# Patient Record
Sex: MALE | Race: AMERICAN INDIAN OR ALASKA NATIVE | NOT HISPANIC OR LATINO | Employment: FULL TIME | URBAN - METROPOLITAN AREA
[De-identification: names, ages, dates, MRNs, and addresses within clinical notes are randomized per-mention and may not be internally consistent; named-entity substitution may affect disease eponyms.]

---

## 2017-05-04 ENCOUNTER — APPOINTMENT (OUTPATIENT)
Dept: RADIOLOGY | Facility: MEDICAL CENTER | Age: 57
DRG: 914 | End: 2017-05-04
Attending: EMERGENCY MEDICINE
Payer: COMMERCIAL

## 2017-05-04 ENCOUNTER — HOSPITAL ENCOUNTER (INPATIENT)
Facility: MEDICAL CENTER | Age: 57
LOS: 1 days | DRG: 914 | End: 2017-05-05
Attending: EMERGENCY MEDICINE | Admitting: SURGERY
Payer: COMMERCIAL

## 2017-05-04 LAB
ERYTHROCYTE [DISTWIDTH] IN BLOOD BY AUTOMATED COUNT: 40.7 FL (ref 35.9–50)
HCT VFR BLD AUTO: 46.6 % (ref 42–52)
HGB BLD-MCNC: 15.7 G/DL (ref 14–18)
MCH RBC QN AUTO: 28.9 PG (ref 27–33)
MCHC RBC AUTO-ENTMCNC: 33.7 G/DL (ref 33.7–35.3)
MCV RBC AUTO: 85.8 FL (ref 81.4–97.8)
PLATELET # BLD AUTO: 247 K/UL (ref 164–446)
PMV BLD AUTO: 10.8 FL (ref 9–12.9)
RBC # BLD AUTO: 5.43 M/UL (ref 4.7–6.1)
WBC # BLD AUTO: 11.2 K/UL (ref 4.8–10.8)

## 2017-05-04 PROCEDURE — 71010 DX-CHEST-LIMITED (1 VIEW): CPT

## 2017-05-04 PROCEDURE — 85027 COMPLETE CBC AUTOMATED: CPT

## 2017-05-04 PROCEDURE — 73070 X-RAY EXAM OF ELBOW: CPT | Mod: LT

## 2017-05-04 PROCEDURE — 96374 THER/PROPH/DIAG INJ IV PUSH: CPT

## 2017-05-04 PROCEDURE — 85730 THROMBOPLASTIN TIME PARTIAL: CPT

## 2017-05-04 PROCEDURE — 80053 COMPREHEN METABOLIC PANEL: CPT

## 2017-05-04 PROCEDURE — 85610 PROTHROMBIN TIME: CPT

## 2017-05-04 PROCEDURE — G0390 TRAUMA RESPONS W/HOSP CRITI: HCPCS

## 2017-05-04 PROCEDURE — 80307 DRUG TEST PRSMV CHEM ANLYZR: CPT

## 2017-05-04 PROCEDURE — 99291 CRITICAL CARE FIRST HOUR: CPT

## 2017-05-04 PROCEDURE — 700111 HCHG RX REV CODE 636 W/ 250 OVERRIDE (IP): Performed by: SURGERY

## 2017-05-04 RX ADMIN — CEFAZOLIN SODIUM 2 G: 1 INJECTION, SOLUTION INTRAVENOUS at 23:40

## 2017-05-04 NOTE — IP AVS SNAPSHOT
" Home Care Instructions                                                                                                                  Name:Ramin Gutierrez  Medical Record Number:[id number]  CSN: [id number]    YOB: 1960   Age: 56 y.o.  Sex: male  HT:1.727 m (5' 8\") WT: 116.3 kg (256 lb 6.3 oz)          Admit Date: 5/4/2017     Discharge Date:   Today's Date: 5/5/2017  Attending Doctor:  Emeterio Kingsley M.D.                  Allergies:  Review of patient's allergies indicates no known allergies.            Discharge Instructions       Discharge Instructions    Discharged to {DISCHARGE TO: Home by car with relative. Discharged via walking, hospital escort: Refused.  Special equipment needed: Not Applicable    Be sure to schedule a follow-up appointment with your primary care doctor or any specialists as instructed.     Discharge Plan:   Diet Plan: Discussed  Activity Level: Discussed  Smoking Cessation Offered: Patient Refused  Confirmed Follow up Appointment: Patient to Call and Schedule Appointment  Confirmed Symptoms Management: Discussed  Medication Reconciliation Updated: Yes  Influenza Vaccine Indication: Not indicated: Previously immunized this influenza season and > 8 years of age    I understand that a diet low in cholesterol, fat, and sodium is recommended for good health. Unless I have been given specific instructions below for another diet, I accept this instruction as my diet prescription.   Other diet: None    Special Instructions: None    · Is patient discharged on Warfarin / Coumadin?   No     · Is patient Post Blood Transfusion?  No    Depression / Suicide Risk    As you are discharged from this [facility] facility, it is important to learn how to keep safe from harming yourself.    Recognize the warning signs:  · Abrupt changes in personality, positive or negative- including increase in energy   · Giving away possessions  · Change in eating patterns- significant weight changes-  positive " or negative  · Change in sleeping patterns- unable to sleep or sleeping all the time   · Unwillingness or inability to communicate  · Depression  · Unusual sadness, discouragement and loneliness  · Talk of wanting to die  · Neglect of personal appearance   · Rebelliousness- reckless behavior  · Withdrawal from people/activities they love  · Confusion- inability to concentrate     If you or a loved one observes any of these behaviors or has concerns about self-harm, here's what you can do:  · Talk about it- your feelings and reasons for harming yourself  · Remove any means that you might use to hurt yourself (examples: pills, rope, extension cords, firearm)  · Get professional help from the community (Mental Health, Substance Abuse, psychological counseling)  · Do not be alone:Call your Safe Contact- someone whom you trust who will be there for you.  · Call your local CRISIS HOTLINE 111-5916 or 374-408-5788  · Call your local Children's Mobile Crisis Response Team [place] (231) 552-9795 or www.BioMotiv  · Call the toll free National Suicide Prevention Hotlines   · National Suicide Prevention Lifeline 610-174-WRWI (9529)  · National Hope Line Network 800-SUICIDE (352-8387)        Follow-up Information     1. Follow up with Pcp Not In Computer In 1 week.    Specialty:  Family Medicine        2. Follow up with Emeterio Kingsley M.D.. Schedule an appointment as soon as possible for a visit in 1 week.    Specialty:  Surgery    Contact information    [address] [facility] [address]  [place] [address] [phone]           Discharge Medication Instructions:    Below are the medications your physician expects you to take upon discharge:    Review all your home medications and newly ordered medications with your doctor and/or pharmacist. Follow medication instructions as directed by your doctor and/or pharmacist.    Please keep your medication list with you and share with your physician.               Medication  List      START taking these medications        Instructions    Morning Afternoon Evening Bedtime    hydrocodone-acetaminophen 5-325 MG Tabs per tablet   Last time this was given:  1 Tab on 5/5/2017 11:27 AM   Commonly known as:  NORCO        Take 1-2 Tabs by mouth every 6 hours as needed.   Dose:  1-2 Tab                          STOP taking these medications     aspirin EC 81 MG Tbec   Commonly known as:  ECOTRIN               multivitamin Tabs                    Where to Get Your Medications      Information about where to get these medications is not yet available     ! Ask your nurse or doctor about these medications    - hydrocodone-acetaminophen 5-325 MG Tabs per tablet            Instructions           Diet / Nutrition:    Follow any diet instructions given to you by your doctor or the dietician, including how much salt (sodium) you are allowed each day.    If you are overweight, talk to your doctor about a weight reduction plan.    Activity:    Remain physically active following your doctor's instructions about exercise and activity.    Rest often.     Any time you become even a little tired or short of breath, SIT DOWN and rest.    Worsening Symptoms:    Report any of the following signs and symptoms to the doctor's office immediately:    *Pain of jaw, arm, or neck  *Chest pain not relieved by medication                               *Dizziness or loss of consciousness  *Difficulty breathing even when at rest   *More tired than usual                                       *Bleeding drainage or swelling of surgical site  *Swelling of feet, ankles, legs or stomach                 *Fever (>100ºF)  *Pink or blood tinged sputum  *Weight gain (3lbs/day or 5lbs /week)           *Shock from internal defibrillator (if applicable)  *Palpitations or irregular heartbeats                *Cool and/or numb extremities    Stroke Awareness    Common Risk Factors for Stroke include:    Age  Atrial Fibrillation  Carotid  Artery Stenosis  Diabetes Mellitus  Excessive alcohol consumption  High blood pressure  Overweight   Physical inactivity  Smoking    Warning signs and symptoms of a stroke include:    *Sudden numbness or weakness of the face, arm or leg (especially on one side of the body).  *Sudden confusion, trouble speaking or understanding.  *Sudden trouble seeing in one or both eyes.  *Sudden trouble walking, dizziness, loss of balance or coordination.Sudden severe headache with no known cause.    It is very important to get treatment quickly when a stroke occurs. If you experience any of the above warning signs, call 911 immediately.                   Disclaimer         Quit Smoking / Tobacco Use:    I understand the use of any tobacco products increases my chance of suffering from future heart disease or stroke and could cause other illnesses which may shorten my life. Quitting the use of tobacco products is the single most important thing I can do to improve my health. For further information on smoking / tobacco cessation call a Toll Free Quit Line at 1-827.893.9885 (*National Cancer [place]) or 1-409.131.2836 (American Lung Association) or you can access the web based program at www.lungusa.org.    [place] Tobacco Users Help Line:  (850) 768-4327       Toll Free: 1-701.256.7738  Quit Tobacco Program [facility] (918)482-3525    Crisis Hotline:    [place] Crisis Hotline:  4-313-FTTOLRH or 1-329.566.7138    [place] Crisis Hotline:    1-131.388.8754 or 175-222-8869    Discharge Survey:   Thank you for choosing [facility]. We hope we did everything we could to make your hospital stay a pleasant one. You may be receiving a phone survey and we would appreciate your time and participation in answering the questions. Your input is very valuable to us in our efforts to improve our service to our patients and their families.        My signature on this form indicates that:    1. I have reviewed and  understand the above information.  2. My questions regarding this information have been answered to my satisfaction.  3. I have formulated a plan with my discharge nurse to obtain my prescribed medications for home.                  Disclaimer         __________________________________                     __________       ________                       Patient Signature                                                 Date                    Time

## 2017-05-04 NOTE — IP AVS SNAPSHOT
5/5/2017    Ramin Gutierrez  No address on file.    Dear Ramin:    [facility] wants to ensure your discharge home is safe and you or your loved ones have had all of your questions answered regarding your care after you leave the hospital.    Below is a list of resources and contact information should you have any questions regarding your hospital stay, follow-up instructions, or active medical symptoms.    Questions or Concerns Regarding… Contact   Medical Questions Related to Your Discharge  (7 days a week, 8am-5pm) Contact a Nurse Care Coordinator   [phone]   Medical Questions Not Related to Your Discharge  (24 hours a day / 7 days a week)  Contact the Nurse Health Line   [phone]    Medications or Discharge Instructions Refer to your discharge packet   or contact your [facility] Primary Care Provider   [phone]   Follow-up Appointment(s) Schedule your appointment via MedAware   or contact Scheduling [phone]   Billing Review your statement via MedAware  or contact Billing [phone]   Medical Records Review your records via MedAware   or contact Medical Records [phone]     You may receive a telephone call within two days of discharge. This call is to make certain you understand your discharge instructions and have the opportunity to have any questions answered. You can also easily access your medical information, test results and upcoming appointments via the MedAware free online health management tool. You can learn more and sign up at [employer]/MedAware. For assistance setting up your MedAware account, please call [phone].    Once again, we want to ensure your discharge home is safe and that you have a clear understanding of any next steps in your care. If you have any questions or concerns, please do not hesitate to contact us, we are here for you. Thank you for choosing [facility] for your healthcare needs.    Sincerely,    Your [facility] Healthcare Team

## 2017-05-04 NOTE — IP AVS SNAPSHOT
[employer] Access Code: [email or web address]  Expires: 6/4/2017  2:28 PM    Your email address is not on file at [facility].  Email Addresses are required for you to sign up for [employer], please contact [phone] to verify your personal information and to provide your email address prior to attempting to register for [employer].    Ramin Gutierrez  No address on file    [employer]  A secure, online tool to manage your health information     [facility]’s [employer]® is a secure, online tool that connects you to your personalized health information from the privacy of your home -- day or night - making it very easy for you to manage your healthcare. Once the activation process is completed, you can even access your medical information using the [employer] latoya, which is available for free in the Apple Latoya store or Google Play store.     To learn more about [employer], visit [email or web address]    There are two levels of access available (as shown below):   My Chart Features  [facility] Primary Care Doctor [facility]  Specialists [facility]  Urgent  Care Non-[facility] Primary Care Doctor   Email your healthcare team securely and privately 24/7 X X X    Manage appointments: schedule your next appointment; view details of past/upcoming appointments X      Request prescription refills. X      View recent personal medical records, including lab and immunizations X X X X   View health record, including health history, allergies, medications X X X X   Read reports about your outpatient visits, procedures, consult and ER notes X X X X   See your discharge summary, which is a recap of your hospital and/or ER visit that includes your diagnosis, lab results, and care plan X X  X     How to register for [employer]:  Once your e-mail address has been verified, follow the following steps to sign up for [employer].     1. Go to  [email or web address]  2. Click on the Sign Up Now box, which takes you to the New Member Sign Up page. You will need to provide the  following information:  a. Enter your LiveN[employer] Access Code exactly as it appears at the top of this page. (You will not need to use this code after you’ve completed the sign-up process. If you do not sign up before the expiration date, you must request a new code.)   b. Enter your date of birth.   c. Enter your home email address.   d. Click Submit, and follow the next screen’s instructions.  3. Create a LiveNinja ID. This will be your LiveNinja login ID and cannot be changed, so think of one that is secure and easy to remember.  4. Create a LiveNinja password. You can change your password at any time.  5. Enter your Password Reset Question and Answer. This can be used at a later time if you forget your password.   6. Enter your e-mail address. This allows you to receive e-mail notifications when new information is available in LiveNinja.  7. Click Sign Up. You can now view your health information.    For assistance activating your [employer] account, call [phone]

## 2017-05-04 NOTE — IP AVS SNAPSHOT
" <p align=\"LEFT\"><IMG SRC=\"//[email or web address]$/[email or web address]\" alt=\"Image\" WIDTH=\"50%\" HEIGHT=\"200\" BORDER=\"\"></p>                   Name:Ramin Gutierrez  Medical Record Number:[id number]  CSN: [id number]    YOB: 1960   Age: 56 y.o.  Sex: male  HT:1.727 m (5' 8\") WT: 116.3 kg (256 lb 6.3 oz)          Admit Date: 5/4/2017     Discharge Date:   Today's Date: 5/5/2017  Attending Doctor:  Emeterio Kingsley M.D.                  Allergies:  Review of patient's allergies indicates no known allergies.          Follow-up Information     1. Follow up with Pcp Not In Computer In 1 week.    Specialty:  Family Medicine        2. Follow up with Emeterio Kingsley M.D.. Schedule an appointment as soon as possible for a visit in 1 week.    Specialty:  Surgery    Contact information    [address] [facility] [address]  [address]  [place] [id number] [phone]           Medication List      Take these Medications        Instructions    hydrocodone-acetaminophen 5-325 MG Tabs per tablet   Commonly known as:  NORCO    Take 1-2 Tabs by mouth every 6 hours as needed.   Dose:  1-2 Tab         "

## 2017-05-05 VITALS
RESPIRATION RATE: 18 BRPM | OXYGEN SATURATION: 95 % | WEIGHT: 256.39 LBS | BODY MASS INDEX: 38.86 KG/M2 | HEIGHT: 68 IN | DIASTOLIC BLOOD PRESSURE: 91 MMHG | HEART RATE: 95 BPM | TEMPERATURE: 98.2 F | SYSTOLIC BLOOD PRESSURE: 122 MMHG

## 2017-05-05 PROBLEM — T14.90XA TRAUMA: Status: ACTIVE | Noted: 2017-05-05

## 2017-05-05 PROBLEM — I10 HTN (HYPERTENSION): Status: ACTIVE | Noted: 2017-05-05

## 2017-05-05 PROBLEM — E10.9 TYPE 1 DIABETES MELLITUS (HCC): Status: ACTIVE | Noted: 2017-05-05

## 2017-05-05 PROBLEM — S41.139A GUNSHOT WOUND OF ARM: Status: ACTIVE | Noted: 2017-05-05

## 2017-05-05 PROBLEM — F10.10 ETOH ABUSE: Status: ACTIVE | Noted: 2017-05-05

## 2017-05-05 PROBLEM — E66.9 OBESITY: Status: ACTIVE | Noted: 2017-05-05
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
## 2017-05-05 LAB
ABO GROUP BLD: NORMAL
ABO GROUP BLD: NORMAL
ALBUMIN SERPL BCP-MCNC: 4 G/DL (ref 3.2–4.9)
ALBUMIN/GLOB SERPL: 1.1 G/DL
ALP SERPL-CCNC: 83 U/L (ref 30–99)
ALT SERPL-CCNC: 31 U/L (ref 2–50)
ANION GAP SERPL CALC-SCNC: 23 MMOL/L (ref 0–11.9)
APTT PPP: 24.4 SEC (ref 24.7–36)
AST SERPL-CCNC: 23 U/L (ref 12–45)
BILIRUB SERPL-MCNC: 0.4 MG/DL (ref 0.1–1.5)
BLD GP AB SCN SERPL QL: NORMAL
BUN SERPL-MCNC: 12 MG/DL (ref 8–22)
CALCIUM SERPL-MCNC: 9.3 MG/DL (ref 8.5–10.5)
CHLORIDE SERPL-SCNC: 96 MMOL/L (ref 96–112)
CO2 SERPL-SCNC: 11 MMOL/L (ref 20–33)
CREAT SERPL-MCNC: 0.86 MG/DL (ref 0.5–1.4)
ETHANOL BLD-MCNC: 0.02 G/DL
EXPOSED MRN EXMRN: NORMAL
GFR SERPL CREATININE-BSD FRML MDRD: >60 ML/MIN/1.73 M 2
GLOBULIN SER CALC-MCNC: 3.5 G/DL (ref 1.9–3.5)
GLUCOSE BLD-MCNC: 268 MG/DL (ref 65–99)
GLUCOSE SERPL-MCNC: 337 MG/DL (ref 65–99)
HBV SURFACE AG SER QL: NEGATIVE
HCV AB SER QL: NEGATIVE
HIV 1+2 AB+HIV1 P24 AG SERPL QL IA: NON REACTIVE
INR PPP: 0.87 (ref 0.87–1.13)
POTASSIUM SERPL-SCNC: 3.9 MMOL/L (ref 3.6–5.5)
PROT SERPL-MCNC: 7.5 G/DL (ref 6–8.2)
PROTHROMBIN TIME: 12.1 SEC (ref 12–14.6)
RH BLD: NORMAL
SODIUM SERPL-SCNC: 130 MMOL/L (ref 135–145)

## 2017-05-05 PROCEDURE — 70498 CT ANGIOGRAPHY NECK: CPT

## 2017-05-05 PROCEDURE — A9270 NON-COVERED ITEM OR SERVICE: HCPCS | Performed by: NURSE PRACTITIONER

## 2017-05-05 PROCEDURE — 86900 BLOOD TYPING SEROLOGIC ABO: CPT

## 2017-05-05 PROCEDURE — 700102 HCHG RX REV CODE 250 W/ 637 OVERRIDE(OP): Performed by: NURSE PRACTITIONER

## 2017-05-05 PROCEDURE — 700117 HCHG RX CONTRAST REV CODE 255: Performed by: EMERGENCY MEDICINE

## 2017-05-05 PROCEDURE — 73206 CT ANGIO UPR EXTRM W/O&W/DYE: CPT | Mod: LT

## 2017-05-05 PROCEDURE — 93931 UPPER EXTREMITY STUDY: CPT | Mod: 26 | Performed by: SURGERY

## 2017-05-05 PROCEDURE — 86850 RBC ANTIBODY SCREEN: CPT

## 2017-05-05 PROCEDURE — 86901 BLOOD TYPING SEROLOGIC RH(D): CPT

## 2017-05-05 PROCEDURE — 770022 HCHG ROOM/CARE - ICU (200)

## 2017-05-05 PROCEDURE — 700105 HCHG RX REV CODE 258: Performed by: SURGERY

## 2017-05-05 PROCEDURE — 93931 UPPER EXTREMITY STUDY: CPT

## 2017-05-05 PROCEDURE — 82962 GLUCOSE BLOOD TEST: CPT

## 2017-05-05 PROCEDURE — 700111 HCHG RX REV CODE 636 W/ 250 OVERRIDE (IP): Performed by: SURGERY

## 2017-05-05 RX ORDER — ONDANSETRON 2 MG/ML
4 INJECTION INTRAMUSCULAR; INTRAVENOUS EVERY 4 HOURS PRN
Status: DISCONTINUED | OUTPATIENT
Start: 2017-05-05 | End: 2017-05-05 | Stop reason: HOSPADM

## 2017-05-05 RX ORDER — HYDROCODONE BITARTRATE AND ACETAMINOPHEN 5; 325 MG/1; MG/1
1-2 TABLET ORAL EVERY 6 HOURS PRN
Status: DISCONTINUED | OUTPATIENT
Start: 2017-05-05 | End: 2017-05-05 | Stop reason: HOSPADM

## 2017-05-05 RX ORDER — DOCUSATE SODIUM 100 MG/1
100 CAPSULE, LIQUID FILLED ORAL 2 TIMES DAILY
Status: DISCONTINUED | OUTPATIENT
Start: 2017-05-05 | End: 2017-05-05 | Stop reason: HOSPADM

## 2017-05-05 RX ORDER — AMOXICILLIN 250 MG
1 CAPSULE ORAL NIGHTLY
Status: DISCONTINUED | OUTPATIENT
Start: 2017-05-05 | End: 2017-05-05 | Stop reason: HOSPADM

## 2017-05-05 RX ORDER — HYDROCODONE BITARTRATE AND ACETAMINOPHEN 5; 325 MG/1; MG/1
1-2 TABLET ORAL EVERY 6 HOURS PRN
Qty: 40 TAB | Refills: 0 | Status: SHIPPED | OUTPATIENT
Start: 2017-05-05 | End: 2019-09-24

## 2017-05-05 RX ORDER — AMOXICILLIN 250 MG
1 CAPSULE ORAL
Status: DISCONTINUED | OUTPATIENT
Start: 2017-05-05 | End: 2017-05-05 | Stop reason: HOSPADM

## 2017-05-05 RX ORDER — BISACODYL 10 MG
10 SUPPOSITORY, RECTAL RECTAL
Status: DISCONTINUED | OUTPATIENT
Start: 2017-05-05 | End: 2017-05-05 | Stop reason: HOSPADM

## 2017-05-05 RX ORDER — POLYETHYLENE GLYCOL 3350 17 G/17G
1 POWDER, FOR SOLUTION ORAL 2 TIMES DAILY
Status: DISCONTINUED | OUTPATIENT
Start: 2017-05-05 | End: 2017-05-05 | Stop reason: HOSPADM

## 2017-05-05 RX ORDER — ENEMA 19; 7 G/133ML; G/133ML
1 ENEMA RECTAL
Status: DISCONTINUED | OUTPATIENT
Start: 2017-05-05 | End: 2017-05-05 | Stop reason: HOSPADM

## 2017-05-05 RX ORDER — SODIUM CHLORIDE, SODIUM LACTATE, POTASSIUM CHLORIDE, CALCIUM CHLORIDE 600; 310; 30; 20 MG/100ML; MG/100ML; MG/100ML; MG/100ML
INJECTION, SOLUTION INTRAVENOUS CONTINUOUS
Status: DISCONTINUED | OUTPATIENT
Start: 2017-05-05 | End: 2017-05-05

## 2017-05-05 RX ORDER — CHLORHEXIDINE GLUCONATE ORAL RINSE 1.2 MG/ML
15 SOLUTION DENTAL EVERY 12 HOURS
Status: DISCONTINUED | OUTPATIENT
Start: 2017-05-05 | End: 2017-05-05

## 2017-05-05 RX ORDER — ACETAMINOPHEN 325 MG/1
650 TABLET ORAL EVERY 4 HOURS PRN
Status: DISCONTINUED | OUTPATIENT
Start: 2017-05-05 | End: 2017-05-05 | Stop reason: HOSPADM

## 2017-05-05 RX ADMIN — SODIUM CHLORIDE, POTASSIUM CHLORIDE, SODIUM LACTATE AND CALCIUM CHLORIDE: 600; 310; 30; 20 INJECTION, SOLUTION INTRAVENOUS at 01:51

## 2017-05-05 RX ADMIN — HYDROCODONE BITARTRATE AND ACETAMINOPHEN 1 TABLET: 5; 325 TABLET ORAL at 11:27

## 2017-05-05 RX ADMIN — IOHEXOL 100 ML: 350 INJECTION, SOLUTION INTRAVENOUS at 00:17

## 2017-05-05 RX ADMIN — MORPHINE SULFATE 50 MG: 50 INJECTION, SOLUTION, CONCENTRATE INTRAVENOUS at 02:28

## 2017-05-05 ASSESSMENT — COPD QUESTIONNAIRES
DO YOU EVER COUGH UP ANY MUCUS OR PHLEGM?: NO/ONLY WITH OCCASIONAL COLDS OR INFECTIONS
COPD SCREENING SCORE: 3
DURING THE PAST 4 WEEKS HOW MUCH DID YOU FEEL SHORT OF BREATH: NONE/LITTLE OF THE TIME
HAVE YOU SMOKED AT LEAST 100 CIGARETTES IN YOUR ENTIRE LIFE: YES

## 2017-05-05 ASSESSMENT — LIFESTYLE VARIABLES
EVER_SMOKED: YES
SUBSTANCE_ABUSE: 1
TOTAL SCORE: 0
TOTAL SCORE: 0
AVERAGE NUMBER OF DAYS PER WEEK YOU HAVE A DRINK CONTAINING ALCOHOL: 1
EVER FELT BAD OR GUILTY ABOUT YOUR DRINKING: NO
CONSUMPTION TOTAL: POSITIVE
HOW MANY TIMES IN THE PAST YEAR HAVE YOU HAD 5 OR MORE DRINKS IN A DAY: 10
TOTAL SCORE: 0
EVER HAD A DRINK FIRST THING IN THE MORNING TO STEADY YOUR NERVES TO GET RID OF A HANGOVER: NO
ON A TYPICAL DAY WHEN YOU DRINK ALCOHOL HOW MANY DRINKS DO YOU HAVE: 8
HAVE YOU EVER FELT YOU SHOULD CUT DOWN ON YOUR DRINKING: NO
ALCOHOL_USE: YES
HAVE PEOPLE ANNOYED YOU BY CRITICIZING YOUR DRINKING: NO

## 2017-05-05 ASSESSMENT — PAIN SCALES - GENERAL
PAINLEVEL_OUTOF10: 2
PAINLEVEL_OUTOF10: 5
PAINLEVEL_OUTOF10: 2

## 2017-05-05 ASSESSMENT — ENCOUNTER SYMPTOMS
DIZZINESS: 0
SPEECH CHANGE: 0
FOCAL WEAKNESS: 0
SHORTNESS OF BREATH: 0
COUGH: 0
MYALGIAS: 1
POLYDIPSIA: 0
SHORTNESS OF BREATH: 1
BLURRED VISION: 0
HEADACHES: 0
FEVER: 0
CHILLS: 0
VOMITING: 0
NAUSEA: 0
DOUBLE VISION: 0
MYALGIAS: 0
BACK PAIN: 0
NECK PAIN: 1
SENSORY CHANGE: 1
ABDOMINAL PAIN: 0

## 2017-05-05 NOTE — PROGRESS NOTES
Med rec complete per patient  Allergies reviewed    Per patient he is borderline diabetic but does not take what medications he's prescribed, stated he feels fine. I asked if he has taken any of them in last 2 weeks and he stated NO. He said it was a BP med, insulin and metformin that's all he could remeber
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
## 2017-05-05 NOTE — ED NOTES
Report given to CLYDE Gray.  Family at bedside.  Pt has all glasses and chart.  Clothing taken by [employer].

## 2017-05-05 NOTE — CARE PLAN
Problem: Safety  Goal: Free from accidental injury  Pt uses call light prior to mobilizing to EOB.  Bed alarm activated.       Problem: Pain  Goal: Alleviation of Pain or a reduction in pain to the patient’s comfort goal  Morphine pca in use.  Continuous dull discomfort in L arm.  Arm elevated for comfort.    Problem: Hemodynamic Status  Goal: Vital Signs and Fluid Balance Management  Intervention: Assess peripheral pulses and capillary refill  Assessment of pulse in L arm.  Slight increase in swelling, arm elevated.  Pulses and CMS intact.
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
## 2017-05-05 NOTE — DISCHARGE PLANNING
Medical Social Work:  Family arrived.     Daughter  Amanda Gutierrez  [phone]  [address] [address]    Family taken to Pt.
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
## 2017-05-05 NOTE — DISCHARGE INSTRUCTIONS
Discharge Instructions    Discharged to {DISCHARGE TO: Home by car with relative. Discharged via walking, hospital escort: Refused.  Special equipment needed: Not Applicable    Be sure to schedule a follow-up appointment with your primary care doctor or any specialists as instructed.     Discharge Plan:   Diet Plan: Discussed  Activity Level: Discussed  Smoking Cessation Offered: Patient Refused  Confirmed Follow up Appointment: Patient to Call and Schedule Appointment  Confirmed Symptoms Management: Discussed  Medication Reconciliation Updated: Yes  Influenza Vaccine Indication: Not indicated: Previously immunized this influenza season and > 8 years of age    I understand that a diet low in cholesterol, fat, and sodium is recommended for good health. Unless I have been given specific instructions below for another diet, I accept this instruction as my diet prescription.   Other diet: None    Special Instructions: None    · Is patient discharged on Warfarin / Coumadin?   No     · Is patient Post Blood Transfusion?  No    Depression / Suicide Risk    As you are discharged from this [facility] facility, it is important to learn how to keep safe from harming yourself.    Recognize the warning signs:  · Abrupt changes in personality, positive or negative- including increase in energy   · Giving away possessions  · Change in eating patterns- significant weight changes-  positive or negative  · Change in sleeping patterns- unable to sleep or sleeping all the time   · Unwillingness or inability to communicate  · Depression  · Unusual sadness, discouragement and loneliness  · Talk of wanting to die  · Neglect of personal appearance   · Rebelliousness- reckless behavior  · Withdrawal from people/activities they love  · Confusion- inability to concentrate     If you or a loved one observes any of these behaviors or has concerns about self-harm, here's what you can do:  · Talk about it- your feelings and reasons for harming  yourself  · Remove any means that you might use to hurt yourself (examples: pills, rope, extension cords, firearm)  · Get professional help from the community (Mental Health, Substance Abuse, psychological counseling)  · Do not be alone:Call your Safe Contact- someone whom you trust who will be there for you.  · Call your local CRISIS HOTLINE 447-7507 or 584-007-9107  · Call your local Children's Mobile Crisis Response Team [place] (721) 322-7155 or www.[employer]ia  · Call the toll free National Suicide Prevention Hotlines   · National Suicide Prevention Lifeline 449-747-NGZR (7053)  · National Hope Line Network 800-SUICIDE (168-2377)

## 2017-05-05 NOTE — CARE PLAN
Problem: Pain Management  Goal: Pain level will decrease to patient’s comfort goal  Intervention: Follow pain managment plan developed in collaboration with patient and Interdisciplinary Team  Ensure patient is using PCA as needed      Problem: Respiratory:  Goal: Respiratory status will improve  Intervention: Assess and monitor pulmonary status  Monitor respiratory status while on morphine PCA
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
## 2017-05-05 NOTE — DISCHARGE PLANNING
The patient requested a note for work.  I typed him a note that stated the patient's name, his admit date, that he is in the ICU at [facility] and that I don't know at this time when he will discharge and that it will be up to the patient's doctor to decide when the patient is able to work.  The patient read it and agreed with the letter and I gave it to him.
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
## 2017-12-27 ENCOUNTER — NON-PROVIDER VISIT (OUTPATIENT)
Dept: URGENT CARE | Facility: CLINIC | Age: 57
End: 2017-12-27

## 2017-12-27 DIAGNOSIS — Z02.1 PRE-EMPLOYMENT DRUG SCREENING: ICD-10-CM

## 2017-12-27 LAB
AMP AMPHETAMINE: NORMAL
COC COCAINE: NORMAL
INT CON NEG: NORMAL
INT CON POS: NORMAL
MET METHAMPHETAMINES: NORMAL
OPI OPIATES: NORMAL
PCP PHENCYCLIDINE: NORMAL
POC DRUG COMMENT 753798-OCCUPATIONAL HEALTH: NORMAL
THC: NORMAL

## 2017-12-27 PROCEDURE — 80305 DRUG TEST PRSMV DIR OPT OBS: CPT | Performed by: NURSE PRACTITIONER

## 2018-01-31 ENCOUNTER — NON-PROVIDER VISIT (OUTPATIENT)
Dept: URGENT CARE | Facility: PHYSICIAN GROUP | Age: 58
End: 2018-01-31

## 2018-01-31 DIAGNOSIS — Z02.1 PRE-EMPLOYMENT DRUG SCREENING: ICD-10-CM

## 2018-01-31 LAB
AMP AMPHETAMINE: NORMAL
COC COCAINE: NORMAL
INT CON NEG: NEGATIVE
INT CON POS: POSITIVE
MET METHAMPHETAMINES: NORMAL
OPI OPIATES: NORMAL
PCP PHENCYCLIDINE: NORMAL
POC DRUG COMMENT 753798-OCCUPATIONAL HEALTH: NEGATIVE
THC: NORMAL

## 2018-01-31 PROCEDURE — 80305 DRUG TEST PRSMV DIR OPT OBS: CPT | Performed by: FAMILY MEDICINE

## 2019-09-24 ENCOUNTER — APPOINTMENT (OUTPATIENT)
Dept: RADIOLOGY | Facility: MEDICAL CENTER | Age: 59
End: 2019-09-24
Attending: EMERGENCY MEDICINE
Payer: MEDICAID

## 2019-09-24 ENCOUNTER — HOSPITAL ENCOUNTER (EMERGENCY)
Facility: MEDICAL CENTER | Age: 59
End: 2019-09-24
Attending: EMERGENCY MEDICINE
Payer: MEDICAID

## 2019-09-24 VITALS
WEIGHT: 224.21 LBS | RESPIRATION RATE: 16 BRPM | OXYGEN SATURATION: 92 % | HEIGHT: 68 IN | BODY MASS INDEX: 33.98 KG/M2 | HEART RATE: 61 BPM | DIASTOLIC BLOOD PRESSURE: 92 MMHG | SYSTOLIC BLOOD PRESSURE: 147 MMHG | TEMPERATURE: 98.2 F

## 2019-09-24 DIAGNOSIS — M79.622 PAIN OF LEFT UPPER ARM: ICD-10-CM

## 2019-09-24 LAB
ALBUMIN SERPL BCP-MCNC: 3.7 G/DL (ref 3.2–4.9)
ALBUMIN/GLOB SERPL: 1.5 G/DL
ALP SERPL-CCNC: 79 U/L (ref 30–99)
ALT SERPL-CCNC: 20 U/L (ref 2–50)
ANION GAP SERPL CALC-SCNC: 10 MMOL/L (ref 0–11.9)
AST SERPL-CCNC: 16 U/L (ref 12–45)
BASOPHILS # BLD AUTO: 0.3 % (ref 0–1.8)
BASOPHILS # BLD: 0.02 K/UL (ref 0–0.12)
BILIRUB SERPL-MCNC: 0.4 MG/DL (ref 0.1–1.5)
BUN SERPL-MCNC: 14 MG/DL (ref 8–22)
CALCIUM SERPL-MCNC: 9 MG/DL (ref 8.5–10.5)
CHLORIDE SERPL-SCNC: 103 MMOL/L (ref 96–112)
CO2 SERPL-SCNC: 23 MMOL/L (ref 20–33)
CREAT SERPL-MCNC: 0.7 MG/DL (ref 0.5–1.4)
EKG IMPRESSION: NORMAL
EKG IMPRESSION: NORMAL
EOSINOPHIL # BLD AUTO: 0.15 K/UL (ref 0–0.51)
EOSINOPHIL NFR BLD: 2.2 % (ref 0–6.9)
ERYTHROCYTE [DISTWIDTH] IN BLOOD BY AUTOMATED COUNT: 41.2 FL (ref 35.9–50)
GLOBULIN SER CALC-MCNC: 2.5 G/DL (ref 1.9–3.5)
GLUCOSE SERPL-MCNC: 323 MG/DL (ref 65–99)
HCT VFR BLD AUTO: 45.2 % (ref 42–52)
HGB BLD-MCNC: 14.5 G/DL (ref 14–18)
IMM GRANULOCYTES # BLD AUTO: 0.05 K/UL (ref 0–0.11)
IMM GRANULOCYTES NFR BLD AUTO: 0.7 % (ref 0–0.9)
LYMPHOCYTES # BLD AUTO: 2.23 K/UL (ref 1–4.8)
LYMPHOCYTES NFR BLD: 32.5 % (ref 22–41)
MCH RBC QN AUTO: 27.9 PG (ref 27–33)
MCHC RBC AUTO-ENTMCNC: 32.1 G/DL (ref 33.7–35.3)
MCV RBC AUTO: 87.1 FL (ref 81.4–97.8)
MONOCYTES # BLD AUTO: 0.55 K/UL (ref 0–0.85)
MONOCYTES NFR BLD AUTO: 8 % (ref 0–13.4)
NEUTROPHILS # BLD AUTO: 3.86 K/UL (ref 1.82–7.42)
NEUTROPHILS NFR BLD: 56.3 % (ref 44–72)
NRBC # BLD AUTO: 0 K/UL
NRBC BLD-RTO: 0 /100 WBC
PLATELET # BLD AUTO: 234 K/UL (ref 164–446)
PMV BLD AUTO: 10.6 FL (ref 9–12.9)
POTASSIUM SERPL-SCNC: 3.7 MMOL/L (ref 3.6–5.5)
PROT SERPL-MCNC: 6.2 G/DL (ref 6–8.2)
RBC # BLD AUTO: 5.19 M/UL (ref 4.7–6.1)
SODIUM SERPL-SCNC: 136 MMOL/L (ref 135–145)
TROPONIN T SERPL-MCNC: 6 NG/L (ref 6–19)
TROPONIN T SERPL-MCNC: <6 NG/L (ref 6–19)
WBC # BLD AUTO: 6.9 K/UL (ref 4.8–10.8)

## 2019-09-24 PROCEDURE — 84484 ASSAY OF TROPONIN QUANT: CPT | Mod: 91

## 2019-09-24 PROCEDURE — 93971 EXTREMITY STUDY: CPT | Mod: LT

## 2019-09-24 PROCEDURE — 93005 ELECTROCARDIOGRAM TRACING: CPT | Performed by: EMERGENCY MEDICINE

## 2019-09-24 PROCEDURE — 71045 X-RAY EXAM CHEST 1 VIEW: CPT

## 2019-09-24 PROCEDURE — A9270 NON-COVERED ITEM OR SERVICE: HCPCS | Performed by: EMERGENCY MEDICINE

## 2019-09-24 PROCEDURE — 85025 COMPLETE CBC W/AUTO DIFF WBC: CPT

## 2019-09-24 PROCEDURE — 700102 HCHG RX REV CODE 250 W/ 637 OVERRIDE(OP): Performed by: EMERGENCY MEDICINE

## 2019-09-24 PROCEDURE — 99285 EMERGENCY DEPT VISIT HI MDM: CPT

## 2019-09-24 PROCEDURE — 80053 COMPREHEN METABOLIC PANEL: CPT

## 2019-09-24 RX ORDER — ASPIRIN 325 MG
325 TABLET ORAL ONCE
Status: COMPLETED | OUTPATIENT
Start: 2019-09-24 | End: 2019-09-24

## 2019-09-24 RX ADMIN — ASPIRIN 325 MG: 325 TABLET, FILM COATED ORAL at 12:15

## 2019-09-24 NOTE — DISCHARGE INSTRUCTIONS
Return to the ER for any worsening arm pain, changing arm pain, swelling to your arm, worsening tingling to your triceps area or other parts of your arm, neck pain, weakness of your hand or arm, discoloration to your hand or arm, or for any concerns.    A small dilation/aneurysm of your brachial artery, measuring 1 cm, was seen today on your ultrasound.  This is likely an incidental finding and not related to your complaint today. However, it is very important that you follow-up with Dr. Tolentino, vascular surgeon, so that he can evaluate you and follow this over time.  Please call today to schedule your follow-up appointment.
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
## 2020-10-05 ENCOUNTER — OFFICE VISIT (OUTPATIENT)
Dept: URGENT CARE | Facility: PHYSICIAN GROUP | Age: 60
End: 2020-10-05
Payer: MEDICAID

## 2020-10-05 ENCOUNTER — HOSPITAL ENCOUNTER (OUTPATIENT)
Facility: MEDICAL CENTER | Age: 60
End: 2020-10-05
Attending: PHYSICIAN ASSISTANT
Payer: MEDICAID

## 2020-10-05 VITALS
WEIGHT: 201 LBS | OXYGEN SATURATION: 98 % | SYSTOLIC BLOOD PRESSURE: 122 MMHG | RESPIRATION RATE: 14 BRPM | TEMPERATURE: 98.4 F | BODY MASS INDEX: 30.46 KG/M2 | HEART RATE: 88 BPM | HEIGHT: 68 IN | DIASTOLIC BLOOD PRESSURE: 60 MMHG

## 2020-10-05 DIAGNOSIS — L02.01 FACIAL ABSCESS: ICD-10-CM

## 2020-10-05 PROCEDURE — 87077 CULTURE AEROBIC IDENTIFY: CPT

## 2020-10-05 PROCEDURE — 10061 I&D ABSCESS COMP/MULTIPLE: CPT | Performed by: PHYSICIAN ASSISTANT

## 2020-10-05 PROCEDURE — 87070 CULTURE OTHR SPECIMN AEROBIC: CPT

## 2020-10-05 PROCEDURE — 87205 SMEAR GRAM STAIN: CPT

## 2020-10-05 PROCEDURE — 87186 SC STD MICRODIL/AGAR DIL: CPT

## 2020-10-05 RX ORDER — CEPHALEXIN 500 MG/1
500 CAPSULE ORAL 4 TIMES DAILY
Qty: 28 CAP | Refills: 0 | Status: SHIPPED | OUTPATIENT
Start: 2020-10-05 | End: 2020-10-05

## 2020-10-05 RX ORDER — CEPHALEXIN 500 MG/1
500 CAPSULE ORAL 4 TIMES DAILY
Qty: 28 CAP | Refills: 0 | Status: SHIPPED | OUTPATIENT
Start: 2020-10-05 | End: 2020-10-12

## 2020-10-05 ASSESSMENT — FIBROSIS 4 INDEX: FIB4 SCORE: 0.87

## 2020-10-05 ASSESSMENT — ENCOUNTER SYMPTOMS
CHILLS: 0
ROS SKIN COMMENTS: FACIAL ABSCESS
FEVER: 0

## 2020-10-05 NOTE — PROGRESS NOTES
"  Subjective:   Ramin Gutierrez is a 59 y.o. male who presents today with   Chief Complaint   Patient presents with   • Jaw Pain     cyst on RT side of jaw , swollen , redness       Other  This is a new problem. The current episode started in the past 7 days. The problem occurs constantly. The problem has been gradually worsening. Pertinent negatives include no chills or fever. Nothing aggravates the symptoms. He has tried nothing for the symptoms. The treatment provided no relief.   Patient noticed an abscess forming on the right side of his jaw and the swelling and redness has gotten worse over the last couple of days.  He denies any dental pain.  Mild tenderness with palpation of the area.  No initial injury or trauma to the area.    PMH:  has a past medical history of Diabetes (HCC).  MEDS:   Current Outpatient Medications:   •  cephALEXin (KEFLEX) 500 MG Cap, Take 1 Cap by mouth 4 times a day for 7 days., Disp: 28 Cap, Rfl: 0  •  aspirin 81 MG tablet, Take 81 mg by mouth every day., Disp: , Rfl:   ALLERGIES: No Known Allergies  SURGHX: No past surgical history on file.  SOCHX:  reports that he has been smoking. He has never used smokeless tobacco. He reports current alcohol use. He reports that he does not use drugs.  FH: Reviewed with patient, not pertinent to this visit.       Review of Systems   Constitutional: Negative for chills and fever.   Skin:        Facial abscess   All other systems reviewed and are negative.       Objective:   /60   Pulse 88   Temp 36.9 °C (98.4 °F) (Temporal)   Resp 14   Ht 1.727 m (5' 8\")   Wt 91.2 kg (201 lb)   SpO2 98%   BMI 30.56 kg/m²   Physical Exam  Vitals signs and nursing note reviewed.   Constitutional:       General: He is not in acute distress.     Appearance: Normal appearance. He is well-developed.   HENT:      Head: Normocephalic and atraumatic.      Right Ear: Hearing normal.      Left Ear: Hearing normal.   Eyes:      Conjunctiva/sclera: Conjunctivae " normal.   Cardiovascular:      Rate and Rhythm: Normal rate and regular rhythm.      Heart sounds: Normal heart sounds.   Pulmonary:      Effort: Pulmonary effort is normal.   Musculoskeletal:      Comments: Normal movement in all 4 extremities   Skin:     General: Skin is warm and dry.             Comments: Superficial abscess approximately 3 cm in diameter to the right mandible.  Fluctuant and indurated.   Neurological:      Mental Status: He is alert.      Coordination: Coordination normal.   Psychiatric:         Mood and Affect: Mood normal.       Patient tolerated drainage today extremely well.    Assessment/Plan:   Assessment    1. Facial abscess  - CULTURE WOUND W/ GRAM STAIN; Future  - cephALEXin (KEFLEX) 500 MG Cap; Take 1 Cap by mouth 4 times a day for 7 days.  Dispense: 28 Cap; Refill: 0  - I and D  Patient was started on antibiotics and will follow-up with wound culture.  Discussed red flag signs and return precautions.  Keep area clean dry and covered.  Differential diagnosis, natural history, supportive care, and indications for immediate follow-up discussed.   Patient given instructions and understanding of medications and treatment.    If not improving in 3-5 days, F/U with PCP or return to  if symptoms worsen.    Patient agreeable to plan.      Please note that this dictation was created using voice recognition software. I have made every reasonable attempt to correct obvious errors, but I expect that there are errors of grammar and possibly content that I did not discover before finalizing the note.    Bhavin Ortega PA-C

## 2020-10-05 NOTE — PROCEDURES
I and D    Date/Time: 10/5/2020 3:48 PM  Performed by: Bhavin Ortega P.A.-C.  Authorized by: Bhavin Ortega P.A.-C.   Type: abscess  Body area: head  Location details: face  Anesthesia: local infiltration    Anesthesia:  Local Anesthetic: lidocaine 2% without epinephrine  Anesthetic total: 2 mL  Scalpel size: 11  Incision type: single straight  Incision depth: dermal  Complexity: complex  Drainage: purulent  Drainage amount: copious  Wound treatment: wound left open  Patient tolerance: patient tolerated the procedure well with no immediate complications        Area was cleaned with Betadine prior to procedure.  We will follow-up with wound culture.  Wound care instructions discussed with patient.  Antibiotic ointment, gauze and Tegaderm was placed today.  Discussed red flag signs that would be of concern for patient to come back in and be reevaluated.  Sterile hemostat were used to perform blunt dissection to take down multiple loculations of the abscess.

## 2020-10-06 ENCOUNTER — TELEPHONE (OUTPATIENT)
Dept: URGENT CARE | Facility: PHYSICIAN GROUP | Age: 60
End: 2020-10-06

## 2020-10-07 LAB
GRAM STN SPEC: NORMAL
SIGNIFICANT IND 70042: NORMAL
SITE SITE: NORMAL
SOURCE SOURCE: NORMAL

## 2020-10-08 LAB
BACTERIA WND AEROBE CULT: ABNORMAL
BACTERIA WND AEROBE CULT: ABNORMAL
GRAM STN SPEC: ABNORMAL
SIGNIFICANT IND 70042: ABNORMAL
SITE SITE: ABNORMAL
SOURCE SOURCE: ABNORMAL

## 2020-10-14 ENCOUNTER — OFFICE VISIT (OUTPATIENT)
Dept: URGENT CARE | Facility: PHYSICIAN GROUP | Age: 60
End: 2020-10-14
Payer: MEDICAID

## 2020-10-14 VITALS
OXYGEN SATURATION: 95 % | WEIGHT: 210.8 LBS | RESPIRATION RATE: 16 BRPM | HEIGHT: 68 IN | SYSTOLIC BLOOD PRESSURE: 110 MMHG | HEART RATE: 108 BPM | TEMPERATURE: 97.8 F | DIASTOLIC BLOOD PRESSURE: 72 MMHG | BODY MASS INDEX: 31.95 KG/M2

## 2020-10-14 DIAGNOSIS — L02.01 FACIAL ABSCESS: ICD-10-CM

## 2020-10-14 DIAGNOSIS — L72.3 SEBACEOUS CYST: ICD-10-CM

## 2020-10-14 PROCEDURE — 99214 OFFICE O/P EST MOD 30 MIN: CPT | Performed by: NURSE PRACTITIONER

## 2020-10-14 RX ORDER — SULFAMETHOXAZOLE AND TRIMETHOPRIM 800; 160 MG/1; MG/1
1 TABLET ORAL 2 TIMES DAILY
Qty: 14 TAB | Refills: 0 | Status: SHIPPED | OUTPATIENT
Start: 2020-10-14 | End: 2020-10-14 | Stop reason: SDUPTHER

## 2020-10-14 RX ORDER — SULFAMETHOXAZOLE AND TRIMETHOPRIM 800; 160 MG/1; MG/1
1 TABLET ORAL 2 TIMES DAILY
Qty: 14 TAB | Refills: 0 | Status: SHIPPED | OUTPATIENT
Start: 2020-10-14 | End: 2020-10-21

## 2020-10-14 ASSESSMENT — ENCOUNTER SYMPTOMS
CHILLS: 0
TINGLING: 0
SENSORY CHANGE: 0
FEVER: 0
MYALGIAS: 0

## 2020-10-14 ASSESSMENT — FIBROSIS 4 INDEX: FIB4 SCORE: 0.87

## 2020-10-14 NOTE — PROGRESS NOTES
Subjective:      Ramin Gutierrez is a 59 y.o. male who presents with Cyst (located on (R) side of face, x1 week )            HPI Recurrent. 59 year old male with I&D last week to facial abscess on right cheek. He presents today with continued induration but without erythema or warmth. He has no pain. He has finished all antibiotics (culture grew Staph aureus). He denies fever, chills, myalgia.  Patient has no known allergies.  Current Outpatient Medications on File Prior to Visit   Medication Sig Dispense Refill   • aspirin 81 MG tablet Take 81 mg by mouth every day.       No current facility-administered medications on file prior to visit.      Social History     Socioeconomic History   • Marital status: Unknown     Spouse name: Not on file   • Number of children: Not on file   • Years of education: Not on file   • Highest education level: Not on file   Occupational History   • Not on file   Social Needs   • Financial resource strain: Not on file   • Food insecurity     Worry: Not on file     Inability: Not on file   • Transportation needs     Medical: Not on file     Non-medical: Not on file   Tobacco Use   • Smoking status: Current Some Day Smoker   • Smokeless tobacco: Never Used   Substance and Sexual Activity   • Alcohol use: Yes     Comment: social   • Drug use: No   • Sexual activity: Not on file   Lifestyle   • Physical activity     Days per week: Not on file     Minutes per session: Not on file   • Stress: Not on file   Relationships   • Social connections     Talks on phone: Not on file     Gets together: Not on file     Attends [employer] service: Not on file     Active member of club or organization: Not on file     Attends meetings of clubs or organizations: Not on file     Relationship status: Not on file   • Intimate partner violence     Fear of current or ex partner: Not on file     Emotionally abused: Not on file     Physically abused: Not on file     Forced sexual activity: Not on file   Other  "Topics Concern   • Not on file   Social History Narrative   • Not on file     Breast Cancer-related family history is not on file.      Review of Systems   Constitutional: Negative for chills, fever and malaise/fatigue.   Musculoskeletal: Negative for myalgias.   Skin:        Cystic lesion in right cheek.   Neurological: Negative for tingling and sensory change.          Objective:     /72   Pulse (!) 108   Temp 36.6 °C (97.8 °F) (Temporal)   Resp 16   Ht 1.727 m (5' 8\")   Wt 95.6 kg (210 lb 12.8 oz)   SpO2 95%   BMI 32.05 kg/m²      Physical Exam  Vitals signs and nursing note reviewed.   Constitutional:       Appearance: Normal appearance. He is not ill-appearing.   Cardiovascular:      Rate and Rhythm: Normal rate and regular rhythm.      Heart sounds: No murmur.   Pulmonary:      Effort: Pulmonary effort is normal.      Breath sounds: Normal breath sounds.   Musculoskeletal: Normal range of motion.   Skin:     General: Skin is warm and dry.      Findings: No erythema.      Comments: Rubbery, mobile lesion subcutaneous in right cheek. No s/s of infection. Likely underlying cyst.   Neurological:      General: No focal deficit present.      Mental Status: He is alert and oriented to person, place, and time.                 Assessment/Plan:        1. Facial abscess  sulfamethoxazole-trimethoprim (BACTRIM DS) 800-160 MG tablet   2. Sebaceous cyst       More likely residual cystic lesion than abscess.  Bactrim to see if there is change.  Reviewed that if interested after antibiotics, we can send to general surgery. He will let us know.  "

## 2022-04-01 ENCOUNTER — APPOINTMENT (OUTPATIENT)
Dept: RADIOLOGY | Facility: IMAGING CENTER | Age: 62
End: 2022-04-01
Attending: FAMILY MEDICINE
Payer: MEDICAID

## 2022-04-01 ENCOUNTER — OFFICE VISIT (OUTPATIENT)
Dept: URGENT CARE | Facility: PHYSICIAN GROUP | Age: 62
End: 2022-04-01
Payer: MEDICAID

## 2022-04-01 VITALS
RESPIRATION RATE: 16 BRPM | DIASTOLIC BLOOD PRESSURE: 76 MMHG | HEART RATE: 100 BPM | HEIGHT: 68 IN | OXYGEN SATURATION: 96 % | WEIGHT: 207 LBS | BODY MASS INDEX: 31.37 KG/M2 | TEMPERATURE: 97.6 F | SYSTOLIC BLOOD PRESSURE: 130 MMHG

## 2022-04-01 DIAGNOSIS — R09.89 CHEST CONGESTION: ICD-10-CM

## 2022-04-01 DIAGNOSIS — R09.89 PULMONARY VASCULAR CONGESTION: ICD-10-CM

## 2022-04-01 DIAGNOSIS — J22 ACUTE LOWER RESPIRATORY INFECTION: ICD-10-CM

## 2022-04-01 DIAGNOSIS — I51.7 CARDIOMEGALY: ICD-10-CM

## 2022-04-01 PROCEDURE — 99214 OFFICE O/P EST MOD 30 MIN: CPT | Performed by: FAMILY MEDICINE

## 2022-04-01 PROCEDURE — 71046 X-RAY EXAM CHEST 2 VIEWS: CPT | Mod: TC,FY | Performed by: FAMILY MEDICINE

## 2022-04-01 RX ORDER — FUROSEMIDE 20 MG/1
20 TABLET ORAL DAILY
Qty: 7 TABLET | Refills: 0 | Status: SHIPPED | OUTPATIENT
Start: 2022-04-01 | End: 2022-04-08

## 2022-04-01 RX ORDER — DOXYCYCLINE HYCLATE 100 MG
100 TABLET ORAL 2 TIMES DAILY
Qty: 14 TABLET | Refills: 0 | Status: SHIPPED | OUTPATIENT
Start: 2022-04-01 | End: 2022-04-08

## 2022-04-01 NOTE — PROGRESS NOTES
Chief Complaint:    Chief Complaint   Patient presents with   • Shortness of Breath       X 3 days , chest congestion - x  1 week       History of Present Illness:    Brother present. Had chest congestion and cough productive of purulent mucus starting 10 days ago after exposed to a lot of dirt and dust, but less purulent mucus at this time. He spit out some mildly purulent mucus in exam room. Hears rattling and gurgling in chest when he lays down. No h/o Asthma or other lung problems. No fever.      Past Medical History:    Past Medical History:   Diagnosis Date   • Diabetes (HCC)      Past Surgical History:    History reviewed. No pertinent surgical history.    Social History:    Social History     Socioeconomic History   • Marital status: Unknown     Spouse name: Not on file   • Number of children: Not on file   • Years of education: Not on file   • Highest education level: Not on file   Occupational History   • Not on file   Tobacco Use   • Smoking status: Current Some Day Smoker   • Smokeless tobacco: Never Used   Vaping Use   • Vaping Use: Never used   Substance and Sexual Activity   • Alcohol use: Yes     Comment: social   • Drug use: No   • Sexual activity: Not on file   Other Topics Concern   • Not on file   Social History Narrative   • Not on file     Social Determinants of Health     Financial Resource Strain: Not on file   Food Insecurity: Not on file   Transportation Needs: Not on file   Physical Activity: Not on file   Stress: Not on file   Social Connections: Not on file   Intimate Partner Violence: Not on file   Housing Stability: Not on file     Family History:    History reviewed. No pertinent family history.    Medications:    No current outpatient medications on file prior to visit.     No current facility-administered medications on file prior to visit.     Allergies:    No Known Allergies      Vitals:    Vitals:    04/01/22 1542   BP: 130/76   Pulse: 100   Resp: 16   Temp: 36.4 °C (97.6 °F)  "  TempSrc: Temporal   SpO2: 96%   Weight: 93.9 kg (207 lb)   Height: 1.727 m (5' 8\")       Physical Exam:    Constitutional: Vital signs reviewed. Appears well-developed and well-nourished. No acute distress.   Eyes: Sclera white, conjunctivae clear.   ENT: External ears normal. Hearing normal.   Neck: Neck supple.   Cardiovascular: Regular rate and rhythm. No murmur.  Pulmonary/Chest: Respirations non-labored. Clear to auscultation bilaterally.  Musculoskeletal: Normal gait. No muscular atrophy or weakness.  Neurological: Alert and oriented to person, place, and time. Muscle tone normal. Coordination normal.   Skin: No rashes or lesions. Warm, dry, normal turgor.  Psychiatric: Normal mood and affect. Behavior is normal. Judgment and thought content normal.       Diagnostics:    DX-CHEST-2 VIEWS  Order: [id number]   Status: Final result     Visible to patient: Arianne (scheduled for 2022  2:01 PM)     Next appt: None     Dx: Chest congestion     0 Result Notes    Details    Reading Physician Reading Date Result Priority   Aminta Sharp M.D.  [phone] 2022 Urgent Care     Narrative & Impression     2022 3:51 PM     HISTORY/REASON FOR EXAM:  Rattling sounds and the chest with gurgling when supine.        TECHNIQUE/EXAM DESCRIPTION AND NUMBER OF VIEWS:  Two views of the chest.     COMPARISON:  2019     FINDINGS:  Lateral view somewhat limited by patient breathing motion.     The mediastinal and cardiac silhouette is mildly enlarged.     Central vessels are minimally prominent.     There is minimal central interstitial opacity.     There is no significant pleural effusion.     There is no visible pneumothorax.     There are no acute bony abnormalities.     IMPRESSION:     1.  The cardiac silhouette is enlarged and there are changes of probable mild vascular congestion.     I personally reviewed the images. Rad report reviewed with them and copy of report to them.      Medical Decision Makin. " Chest congestion  - DX-CHEST-2 VIEWS; Future    2. Acute lower respiratory infection  - doxycycline (VIBRAMYCIN) 100 MG Tab; Take 1 Tablet by mouth 2 times a day for 7 days.  Dispense: 14 Tablet; Refill: 0    3. Cardiomegaly  - furosemide (LASIX) 20 MG Tab; Take 1 Tablet by mouth every day for 7 days. To decrease fluid in lungs.  Dispense: 7 Tablet; Refill: 0    4. Pulmonary vascular congestion  - furosemide (LASIX) 20 MG Tab; Take 1 Tablet by mouth every day for 7 days. To decrease fluid in lungs.  Dispense: 7 Tablet; Refill: 0      Discussed with him DDX, management options, and risks, benefits, and alternatives to treatment plan agreed upon.    Brother present. Had chest congestion and cough productive of purulent mucus starting 10 days ago after exposed to a lot of dirt and dust, but less purulent mucus at this time. He spit out some mildly purulent mucus in exam room. Hears rattling and gurgling in chest when he lays down. No h/o Asthma or other lung problems. No fever.    CXR: The cardiac silhouette is enlarged and there are changes of probable mild vascular congestion.    ? etiology of symptoms. As vascular congestion on CXR can be due to infection and/or CHF, recommended treatment with antibiotic and low-dose diuretic. He is agreeable.     Agreeable to medications prescribed.    Advised if getting worse, he should be seen in Emergency Room as CT imaging (we do not have here) may need to be done.    He will return to urgent care if needed.

## 2022-08-22 ENCOUNTER — OFFICE VISIT (OUTPATIENT)
Dept: URGENT CARE | Facility: PHYSICIAN GROUP | Age: 62
End: 2022-08-22
Payer: MEDICAID

## 2022-08-22 VITALS
BODY MASS INDEX: 30.46 KG/M2 | DIASTOLIC BLOOD PRESSURE: 72 MMHG | WEIGHT: 201 LBS | HEART RATE: 106 BPM | OXYGEN SATURATION: 100 % | SYSTOLIC BLOOD PRESSURE: 118 MMHG | TEMPERATURE: 97.9 F | RESPIRATION RATE: 18 BRPM | HEIGHT: 68 IN

## 2022-08-22 DIAGNOSIS — S05.01XA ABRASION OF RIGHT CORNEA, INITIAL ENCOUNTER: ICD-10-CM

## 2022-08-22 PROCEDURE — 99214 OFFICE O/P EST MOD 30 MIN: CPT | Performed by: NURSE PRACTITIONER

## 2022-08-22 RX ORDER — POLYMYXIN B SULFATE AND TRIMETHOPRIM 1; 10000 MG/ML; [USP'U]/ML
1 SOLUTION OPHTHALMIC 4 TIMES DAILY
Qty: 10 ML | Refills: 0 | Status: SHIPPED | OUTPATIENT
Start: 2022-08-22

## 2022-08-22 ASSESSMENT — ENCOUNTER SYMPTOMS
BLURRED VISION: 1
NAUSEA: 0
FEVER: 0
EYE PAIN: 1
EYE REDNESS: 1
HEADACHES: 0
EYE DISCHARGE: 0

## 2022-08-22 NOTE — LETTER
August 22, 2022        Ramin SHRESTHA Matt  [address]  [place] [address]        Ramin was seen in our clinic today and he is excused from work for today and tomorrow. Thank you.  If you have any questions or concerns, please don't hesitate to call.        Sincerely,        ANI Felix.PMAYA.    Electronically Signed

## 2022-08-22 NOTE — PROGRESS NOTES
Subjective     Ramin Gutierrez is a 61 y.o. male who presents with Foreign Body in Eye (Late last night started to feel something in left eye, feels something could also be in the right. )            HPI New. 61 year old male with possible foreign body in both eyes since last night. He denies nausea, headache, loss of vision. He has mild FB sensation bilaterally with some injection but no tearing of eye. He has some blurred vision as well. No loss of visual fields. He has tried visine for treatment with no improvement. He does not wear contact lenses.  Patient has no known allergies.  No current outpatient medications on file prior to visit.     No current facility-administered medications on file prior to visit.     Social History     Socioeconomic History    Marital status: Unknown     Spouse name: Not on file    Number of children: Not on file    Years of education: Not on file    Highest education level: Not on file   Occupational History    Not on file   Tobacco Use    Smoking status: Some Days    Smokeless tobacco: Never   Vaping Use    Vaping Use: Never used   Substance and Sexual Activity    Alcohol use: Yes     Comment: social    Drug use: No    Sexual activity: Not on file   Other Topics Concern    Not on file   Social History Narrative    Not on file     Social Determinants of Health     Financial Resource Strain: Not on file   Food Insecurity: Not on file   Transportation Needs: Not on file   Physical Activity: Not on file   Stress: Not on file   Social Connections: Not on file   Intimate Partner Violence: Not on file   Housing Stability: Not on file     Breast Cancer-related family history is not on file.      Review of Systems   Constitutional:  Negative for fever.   HENT:  Negative for congestion.    Eyes:  Positive for blurred vision, pain and redness. Negative for discharge.   Gastrointestinal:  Negative for nausea.   Neurological:  Negative for headaches.            Objective     /72    "Pulse (!) 106   Temp 36.6 °C (97.9 °F) (Temporal)   Resp 18   Ht 1.727 m (5' 8\")   Wt 91.2 kg (201 lb)   SpO2 100%   BMI 30.56 kg/m²      Physical Exam  Vitals and nursing note reviewed.   Constitutional:       Appearance: Normal appearance.   Eyes:      General: Lids are normal.         Right eye: No foreign body.         Left eye: No foreign body.      Extraocular Movements: Extraocular movements intact.      Conjunctiva/sclera:      Right eye: Right conjunctiva is injected.      Left eye: Left conjunctiva is injected.      Pupils: Pupils are equal, round, and reactive to light.      Right eye: Corneal abrasion and fluorescein uptake present.      Left eye: Fluorescein uptake present. No corneal abrasion.     Cardiovascular:      Rate and Rhythm: Normal rate and regular rhythm.      Heart sounds: No murmur heard.  Pulmonary:      Effort: Pulmonary effort is normal.      Breath sounds: Normal breath sounds.   Musculoskeletal:         General: Normal range of motion.   Skin:     General: Skin is warm and dry.   Neurological:      General: No focal deficit present.      Mental Status: He is alert and oriented to person, place, and time.                           Assessment & Plan        1. Abrasion of right cornea, initial encounter  polymixin-trimethoprim (POLYTRIM) 18885-8.1 UNIT/ML-% Solution        Recommend follow up with eye doctor.  Polytrim 4 times daily x 7 days.  Differential diagnosis, natural history, supportive care, and indications for immediate follow-up discussed at length.           "

## 2024-09-29 ENCOUNTER — APPOINTMENT (OUTPATIENT)
Dept: RADIOLOGY | Facility: IMAGING CENTER | Age: 64
End: 2024-09-29
Attending: FAMILY MEDICINE
Payer: OTHER GOVERNMENT

## 2024-09-29 ENCOUNTER — OFFICE VISIT (OUTPATIENT)
Dept: URGENT CARE | Facility: PHYSICIAN GROUP | Age: 64
End: 2024-09-29
Payer: OTHER GOVERNMENT

## 2024-09-29 VITALS
BODY MASS INDEX: 31.52 KG/M2 | RESPIRATION RATE: 16 BRPM | OXYGEN SATURATION: 98 % | WEIGHT: 208 LBS | HEART RATE: 104 BPM | TEMPERATURE: 97.5 F | DIASTOLIC BLOOD PRESSURE: 72 MMHG | SYSTOLIC BLOOD PRESSURE: 136 MMHG | HEIGHT: 68 IN

## 2024-09-29 DIAGNOSIS — S20.212A CONTUSION OF LEFT CHEST WALL, INITIAL ENCOUNTER: ICD-10-CM

## 2024-09-29 DIAGNOSIS — S22.42XA CLOSED FRACTURE OF MULTIPLE RIBS OF LEFT SIDE, INITIAL ENCOUNTER: ICD-10-CM

## 2024-09-29 PROCEDURE — 71101 X-RAY EXAM UNILAT RIBS/CHEST: CPT | Mod: TC,FY,LT | Performed by: RADIOLOGY

## 2024-09-29 NOTE — LETTER
September 29, 2024         Patient: Ramin Gutierrez   YOB: 1960   Date of Visit: 9/29/2024           To Whom it May Concern:    Ramin Gutierrez was seen in my clinic on 9/29/2024. He may return to work on 9/30.    If you have any questions or concerns, please don't hesitate to call.        Sincerely,           Jose Nguyen M.D.  Electronically Signed

## 2024-09-29 NOTE — PROGRESS NOTES
"Subjective:         Chief Complaint   Patient presents with    Fall     TRIP AND FALL LANDED ON STEP STOOL WITH CHEST. PAIN AND SWELLING ON LEFT SIDE OF CHEST.                             Rib Pain       Pt c/o sharp, constant left  lower ribcage pain x  days , after GLF.    Pain is constant and worse with deep breathing.       Pain not improved with motrin.        denies dyspnea.       Pertinent negatives include no claudication, cough, exertional chest pressure, fever, nausea, near-syncope, numbness, syncope or vomiting.           Past Medical History:   Diagnosis Date    Diabetes (HCC)          Social History     Tobacco Use    Smoking status: Some Days    Smokeless tobacco: Never   Vaping Use    Vaping status: Never Used   Substance Use Topics    Alcohol use: Yes     Comment: social    Drug use: No         Family history was reviewed and not pertinent       Review of Systems:  Review of Systems   Constitutional: Negative for fever.   HENT: no neck pain, headache or dizziness  Eyes: denies vision changes  Respiratory: no cough, congestion, SOB  Cardiovascular: denies palpations     Gastrointestinal: denies diarrhea, abdominal pain or constipation.  No blood in stool.  Musculoskeletal: denies back pain or joint pain    Skin: no itching or rash  Neurological: No numbness or tingling.   10 point ROS otherwise negative, except per HPI         Objective:     /72   Pulse (!) 104   Temp 36.4 °C (97.5 °F) (Temporal)   Resp 16   Ht 1.727 m (5' 8\")   Wt 94.3 kg (208 lb)   SpO2 98%       Physical Exam   Constitutional: pt is oriented to person, place, and time. Pt appears well-developed and well-nourished.   HENT:   Head: Normocephalic and atraumatic.   Mouth/Throat: Oropharynx is clear and moist.   Eyes: Conjunctivae and EOM are normal. Pupils are equal, round, and reactive to light. No scleral icterus.   Neck: Normal range of motion. Neck supple. No JVD present. No thyromegaly present.   Cardiovascular: Normal " rate, regular rhythm, normal heart sounds and intact distal pulses.  Exam reveals no gallop and no friction rub.    No murmur heard.  Pulmonary/Chest: Effort normal and breath sounds normal. No respiratory distress. Pt has no wheezes. Pt has no rales. Pt exhibits point tenderness over several   ribs on left  side   Abdominal: Soft.   Musculoskeletal: Normal range of motion. Pt exhibits no edema.   Lymphadenopathy:     Pt has no cervical adenopathy.   Neurological: pt is alert and oriented to person, place, and time. No cranial nerve deficit.   Skin: Skin is warm and dry. No erythema.   Psychiatric: pt has a normal mood and affect. patient's behavior is normal.        Result Notes  Details    Reading Physician Reading Date Result Priority   Arsen Bravo M.D.  [phone] 9/29/2024      Narrative & Impression     9/29/2024 3:04 PM     HISTORY/REASON FOR EXAM:  Chest pain after chest injury.        TECHNIQUE/EXAM DESCRIPTION AND NUMBER OF VIEWS:  5 images of the left ribs and chest.     COMPARISON: 04/01/2022     FINDINGS:  Minimally displaced fractures of the distal left ninth and 10th ribs are identified. No other fractures are identified. No evidence of pneumothorax.     IMPRESSION:     1.  Apparent mildly displaced fractures of left ninth and 10th ribs located distally.     2.  No pneumothorax identified.           Exam Ended: 09/29/24  3:12 PM Last Resulted: 09/29/24  3:22 PM        Assessment/Plan:           1. Contusion of left chest wall, initial encounter  2. Closed fracture of multiple ribs of left side, initial encounter       Chest x-ray was personally interpreted and reviewed.    2 rib fxrs as noted above.     No pulmonary infiltrates or densities.   No pneumo             - diclofenac sodium (VOLTAREN) 1 % Gel; Apply 4 g topically in the morning, at noon, and at bedtime.  Dispense: 50 g; Refill: 0    Differential diagnosis, natural history, supportive care, and indications for immediate follow-up  discussed. All questions answered. Patient agrees with the plan of care.     Follow-up as needed if symptoms worsen or fail to improve to PCP, Urgent care or Emergency Room.     I have personally reviewed prior external notes and test results pertinent to today's visit.  I have independently reviewed and interpreted all diagnostics ordered during this urgent care acute visit.

## 2024-09-30 ENCOUNTER — TELEPHONE (OUTPATIENT)
Dept: URGENT CARE | Facility: PHYSICIAN GROUP | Age: 64
End: 2024-09-30
Payer: OTHER GOVERNMENT